# Patient Record
Sex: FEMALE | Race: WHITE | NOT HISPANIC OR LATINO
[De-identification: names, ages, dates, MRNs, and addresses within clinical notes are randomized per-mention and may not be internally consistent; named-entity substitution may affect disease eponyms.]

---

## 2018-03-20 ENCOUNTER — APPOINTMENT (OUTPATIENT)
Dept: HEART AND VASCULAR | Facility: CLINIC | Age: 73
End: 2018-03-20
Payer: MEDICARE

## 2018-03-20 VITALS
HEART RATE: 62 BPM | TEMPERATURE: 97.9 F | WEIGHT: 137.99 LBS | DIASTOLIC BLOOD PRESSURE: 80 MMHG | HEIGHT: 60.24 IN | SYSTOLIC BLOOD PRESSURE: 152 MMHG | BODY MASS INDEX: 26.74 KG/M2 | OXYGEN SATURATION: 97 %

## 2018-03-20 PROCEDURE — 99214 OFFICE O/P EST MOD 30 MIN: CPT | Mod: 25

## 2018-03-20 PROCEDURE — 93000 ELECTROCARDIOGRAM COMPLETE: CPT

## 2018-03-20 RX ORDER — DIVALPROEX SODIUM 500 MG/1
500 TABLET, DELAYED RELEASE ORAL
Refills: 0 | Status: ACTIVE | COMMUNITY

## 2018-03-20 RX ORDER — ASPIRIN 81 MG
81 TABLET, DELAYED RELEASE (ENTERIC COATED) ORAL
Refills: 0 | Status: ACTIVE | COMMUNITY

## 2018-03-20 RX ORDER — BUPROPION HCL 150 MG
150 TABLET,SUSTAINED-RELEASE 12 HR ORAL
Refills: 0 | Status: ACTIVE | COMMUNITY

## 2018-03-20 RX ORDER — BUPROPION HYDROCHLORIDE 150 MG/1
150 TABLET, EXTENDED RELEASE ORAL
Qty: 90 | Refills: 0 | Status: ACTIVE | COMMUNITY
Start: 2018-02-10

## 2018-03-20 RX ORDER — ESCITALOPRAM OXALATE 10 MG/1
10 TABLET, FILM COATED ORAL
Refills: 0 | Status: ACTIVE | COMMUNITY

## 2018-03-20 RX ORDER — DIVALPROEX SODIUM 250 MG/1
250 TABLET, DELAYED RELEASE ORAL
Refills: 0 | Status: ACTIVE | COMMUNITY

## 2018-03-20 RX ORDER — VALSARTAN 80 MG/1
80 TABLET ORAL DAILY
Qty: 90 | Refills: 3 | Status: ACTIVE | COMMUNITY

## 2018-03-20 RX ORDER — ESCITALOPRAM OXALATE 5 MG/1
5 TABLET, FILM COATED ORAL
Refills: 0 | Status: ACTIVE | COMMUNITY

## 2018-03-27 ENCOUNTER — APPOINTMENT (OUTPATIENT)
Dept: HEART AND VASCULAR | Facility: CLINIC | Age: 73
End: 2018-03-27

## 2018-04-05 ENCOUNTER — RX RENEWAL (OUTPATIENT)
Age: 73
End: 2018-04-05

## 2018-08-29 ENCOUNTER — RX RENEWAL (OUTPATIENT)
Age: 73
End: 2018-08-29

## 2019-02-14 ENCOUNTER — RX RENEWAL (OUTPATIENT)
Age: 74
End: 2019-02-14

## 2019-03-27 ENCOUNTER — RX RENEWAL (OUTPATIENT)
Age: 74
End: 2019-03-27

## 2019-03-28 ENCOUNTER — RX RENEWAL (OUTPATIENT)
Age: 74
End: 2019-03-28

## 2019-03-28 RX ORDER — SIMVASTATIN 10 MG/1
10 TABLET, FILM COATED ORAL DAILY
Qty: 10 | Refills: 0 | Status: ACTIVE | COMMUNITY
Start: 1900-01-01 | End: 1900-01-01

## 2019-06-27 ENCOUNTER — APPOINTMENT (OUTPATIENT)
Dept: OTOLARYNGOLOGY | Facility: CLINIC | Age: 74
End: 2019-06-27
Payer: MEDICARE

## 2019-06-27 PROCEDURE — 92557 COMPREHENSIVE HEARING TEST: CPT

## 2019-06-27 PROCEDURE — 92550 TYMPANOMETRY & REFLEX THRESH: CPT

## 2020-02-03 ENCOUNTER — NON-APPOINTMENT (OUTPATIENT)
Age: 75
End: 2020-02-03

## 2020-02-03 ENCOUNTER — APPOINTMENT (OUTPATIENT)
Dept: HEART AND VASCULAR | Facility: CLINIC | Age: 75
End: 2020-02-03
Payer: MEDICARE

## 2020-02-03 VITALS
OXYGEN SATURATION: 98 % | SYSTOLIC BLOOD PRESSURE: 116 MMHG | BODY MASS INDEX: 27.09 KG/M2 | DIASTOLIC BLOOD PRESSURE: 74 MMHG | WEIGHT: 138 LBS | HEART RATE: 68 BPM | HEIGHT: 60 IN

## 2020-02-03 DIAGNOSIS — I10 ESSENTIAL (PRIMARY) HYPERTENSION: ICD-10-CM

## 2020-02-03 DIAGNOSIS — I25.10 ATHEROSCLEROTIC HEART DISEASE OF NATIVE CORONARY ARTERY W/OUT ANGINA PECTORIS: ICD-10-CM

## 2020-02-03 DIAGNOSIS — Z01.810 ENCOUNTER FOR PREPROCEDURAL CARDIOVASCULAR EXAMINATION: ICD-10-CM

## 2020-02-03 DIAGNOSIS — Z00.00 ENCOUNTER FOR GENERAL ADULT MEDICAL EXAMINATION W/OUT ABNORMAL FINDINGS: ICD-10-CM

## 2020-02-03 PROCEDURE — 99213 OFFICE O/P EST LOW 20 MIN: CPT

## 2020-02-03 PROCEDURE — 93000 ELECTROCARDIOGRAM COMPLETE: CPT

## 2020-02-03 NOTE — DISCUSSION/SUMMARY
[Patient Intermediate Risk] : the patient is an intermediate risk [Procedure Intermediate Risk] : the procedure risk is intermediate [Optimized for Surgery] : the patient is optimized for surgery [As per surgery] : as per surgery [Continue] : Continue medications as currently directed [FreeTextEntry1] : RCRI Class 1 Risk\par hold ASA pending surgery\par >7 METS\par normal ECG\par CV stable for surgery

## 2020-02-03 NOTE — PHYSICAL EXAM
[General Appearance - Well Developed] : well developed [Normal Appearance] : normal appearance [No Deformities] : no deformities [Well Groomed] : well groomed [General Appearance - Well Nourished] : well nourished [Normal Conjunctiva] : the conjunctiva exhibited no abnormalities [General Appearance - In No Acute Distress] : no acute distress [Eyelids - No Xanthelasma] : the eyelids demonstrated no xanthelasmas [Normal Oral Mucosa] : normal oral mucosa [No Oral Pallor] : no oral pallor [No Oral Cyanosis] : no oral cyanosis [Normal Jugular Venous V Waves Present] : normal jugular venous V waves present [Normal Jugular Venous A Waves Present] : normal jugular venous A waves present [No Jugular Venous Andrews A Waves] : no jugular venous andrews A waves [Exaggerated Use Of Accessory Muscles For Inspiration] : no accessory muscle use [Respiration, Rhythm And Depth] : normal respiratory rhythm and effort [Auscultation Breath Sounds / Voice Sounds] : lungs were clear to auscultation bilaterally [Murmurs] : no murmurs present [Heart Sounds] : normal S1 and S2 [Heart Rate And Rhythm] : heart rate and rhythm were normal [Abdomen Tenderness] : non-tender [Abdomen Soft] : soft [Abdomen Mass (___ Cm)] : no abdominal mass palpated [Gait - Sufficient For Exercise Testing] : the gait was sufficient for exercise testing [Abnormal Walk] : normal gait [Cyanosis, Localized] : no localized cyanosis [Nail Clubbing] : no clubbing of the fingernails [Petechial Hemorrhages (___cm)] : no petechial hemorrhages [Skin Color & Pigmentation] : normal skin color and pigmentation [] : no rash [No Venous Stasis] : no venous stasis [Skin Lesions] : no skin lesions [No Skin Ulcers] : no skin ulcer [No Xanthoma] : no  xanthoma was observed [Oriented To Time, Place, And Person] : oriented to person, place, and time [Affect] : the affect was normal [No Anxiety] : not feeling anxious [Mood] : the mood was normal

## 2020-02-03 NOTE — HISTORY OF PRESENT ILLNESS
[Preoperative Visit] : for a medical evaluation prior to surgery [Surgeon Name ___] : surgeon: [unfilled] [Cardiovascular Disease] : cardiovascular disease [Anti-Platelet Agents] : anti-platelet agents [Prior Anesthesia] : Prior anesthesia [Electrocardiogram] : ~T an ECG ~C was performed [Cardiovascular Stress Test] : a cardiac stress test ~T ~C was performed [Cardiac Catheterization  (Diagnostic)] : cardiac catheterization ~T ~C was performed [Metabolic Capacity ___Mets%] : The patient has a metabolic capacity of [unfilled] Mets%  [Good] : Good [Fever] : no fever [Chills] : no chills [Fatigue] : no fatigue [Chest Pain] : no chest pain [Cough] : no cough [Dyspnea] : no dyspnea [Dysuria] : no dysuria [Urinary Frequency] : no urinary frequency [Nausea] : no nausea [Vomiting] : no vomiting [Diarrhea] : no diarrhea [Abdominal Pain] : no abdominal pain [Easy Bruising] : no easy bruising [Lower Extremity Swelling] : no lower extremity swelling [Poor Exercise Tolerance] : no poor exercise tolerance [Diabetes] : no diabetes [Pulmonary Disease] : no pulmonary disease [Nicotine Dependence] : no nicotine dependence [Alcohol Use] : no  alcohol use [GI Disease] : no gastrointestinal disease [Renal Disease] : no renal disease [Sleep Apnea] : no sleep apnea [Thromboembolic Problems] : no thromboembolic problems [Frequent use of NSAIDs] : no use of NSAIDs [Impaired Immunity] : no impaired immunity [Transfusion Reaction] : no transfusion reaction [Steroid Use in Last 6 Months] : no steroid use in the last six months [Frequent Aspirin Use] : no frequent aspirin use [Prev Anesthesia Reaction] : no previous anesthesia reaction [Anesthesia Reaction] : no anesthesia reaction [Sudden Death] : no sudden death [Clotting Disorder] : no clotting disorder [Bleeding Disorder] : no bleeding disorder [de-identified] : Rt breast Lumpectomy [FreeTextEntry1] : \par 75 F CAD HTN HPL here for preop cv eval for above surgery\par \par PCI x 3, 1998, ISR x2 PCI 2000\par NST within 5 years normal\par \par ECG today nsr normal segments and intervals

## 2020-08-20 ENCOUNTER — OUTPATIENT (OUTPATIENT)
Dept: OUTPATIENT SERVICES | Facility: HOSPITAL | Age: 75
LOS: 1 days | End: 2020-08-20
Payer: COMMERCIAL

## 2020-08-20 DIAGNOSIS — R06.02 SHORTNESS OF BREATH: ICD-10-CM

## 2020-08-20 PROCEDURE — 93306 TTE W/DOPPLER COMPLETE: CPT

## 2020-08-20 PROCEDURE — 93306 TTE W/DOPPLER COMPLETE: CPT | Mod: 26

## 2020-08-20 PROCEDURE — 93017 CV STRESS TEST TRACING ONLY: CPT

## 2022-04-14 ENCOUNTER — APPOINTMENT (OUTPATIENT)
Dept: ORTHOPEDIC SURGERY | Facility: CLINIC | Age: 77
End: 2022-04-14
Payer: MEDICARE

## 2022-04-14 PROCEDURE — 99203 OFFICE O/P NEW LOW 30 MIN: CPT

## 2022-04-14 NOTE — PHYSICAL EXAM
[de-identified] : PHYSICAL EXAM  RIGHT  SHOULDER\par \par MODERATE  SCAPULAR PROTRACTION\par AROM 120 / 120 / 70 / 0\par TENDER: SA REGION  LATERAL \par \par SPECIAL TESTING :\par NAM - POSITIVE \par STELLA - POSITIVE \par SPEED TEST - POSITIVE\par \par DANG - NEGATIVE \par APPREHENSION AND SUPPRESSION - NEGATIVE \par \par RC STRENGTH TESTING \par SS:  4/5\par SUB 5/5\par IS     5/5\par BICEPS  5/5\par \par SENSATION  - GROSSLY INTACT\par \par \par  [de-identified] : JAN 31, 2022- RIGHT SHOULDER MRI \par IMPRESSION: \par THERE IS A FULL THICKNESS TEAR OF THE SUPRASPINATUS TENDON WITH TENDON RETRACTION, CHRONIC. MODERATE MUSCLE ATROPHY. \par 2. PARTIAL THICKNESS TEARING OF THE INFRASPINATUS TENDON WITHOUT TENDON RETRACTION. MODERATE MUSCLE ATROPHY. \par 3. MILD CHRONIC PARTIAL TEARING OF THE SUBSCAPULARIS TENDON WITH TENDON THINNING BUT NO TENDON RETRACTION OR MUSCLE ATROPHY. \par 4. GLENOHUMERAL JOINT FLUID EXTRAVASATED TO THE SUBACROMIAL- SUBDELTOID BURSA. \par 5. MILD CARTILAGE THINNING IN THE SUPERIOR HUMERAL HEAD. 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 \par 6. COMPLETE RUPTURE OF THE LONG HEAD OF THE BICEPS TENDON WITH DISTAL TENDON RETRACTION. \par

## 2022-04-14 NOTE — DISCUSSION/SUMMARY
[de-identified] : PREOP SHOULDER SURGERY DISCUSSION:\par \par PROCEDURE DISCUSSED - QUESTIONS ANSWERED\par PATIENT WISHES TO PROCEED\par \par POST OP CARE AND LIMITATIONS REVIEWED - HANDOUT PROVIDED \par \par COLD PACKS RECOMMENDED\par ANALGESICS PRESCRIBED \par \par \par THERE ARE NO GUARANTEES THAT ALL SYMPTOMS WILL BE ALLEVIATED  \par SHOULDER ARTHROSCOPY, ACROMIOPLASTY, DEBRIDEMENT,  RC REPAIR AND LABRUM REPAIRS- ON AVERAGE 75- 85% SATISFACTORY RESULTS FOR TEARS < 3CM AFTER 9-12 MONTHS HEALING AND REHABILITATION. \par \par REPAIRS WILL REQUIRE STRICT SHOULDER IMMOBILIZER 4-6 WEEKS\par \par RC TEARS 3CM OR LARGER MAY REQUIRE COLLAGEN PATCH AUGMENTATION GENERALLY HAVE LESS SATISFACTORY RESULTS\par \par PHYSICAL THERAPY REQUIRED 2X WEEK FOR  MINIMUM 8-12 WEEKS FOR ALL PROCEDURES \par CONTINUED HOME EXERCISES 6-9 MONTHS AFTER THAT REQUIRED FOR OPTIMAL OUTCOMES \par \par ROUTINE SURGICAL AND ANESTHETIC RISKS INCLUDE RISK OF SURGICAL INFECTION, ANESTHETIC COMPLICATION OR ALLERGY, POSSIBLE RETEARS OR PROGRESSION OF TEAR, STIFFNESS OF SHOULDER AND UNSATISFACTORY OUTCOMES\par \par PATIENT UNDERSTANDS AND WISHES TO PROCEED\par

## 2022-04-14 NOTE — HISTORY OF PRESENT ILLNESS
[de-identified] : RIGHT SHOULDER PAIN \par PAIN STARTED FEBRUARY 2022- NO SPECIFIC INJURY \par INTERMITTENT \par SHARP\par RADIATES UP NECK \par STIFFNESS\par PAIN LEVEL: 8/10 \par WORSE WITH OVER HEAD LIFTING, REACHING BEHIND, AT NIGHT \par PT TRIED PHYSICAL THERAPY FOR ONE MONTH (MARCH 2022)- HELPFUL \par PT HAD ONE CORTISONE INJECTION FEBRUARY 2022- HELPFUL FOR 2-3 WEEKS  \par PT HAS MRI AVAILABLE FROM [facility] 01/31/2022\par BETTER WITH TYLENOL AND REST

## 2022-05-04 ENCOUNTER — APPOINTMENT (OUTPATIENT)
Dept: ORTHOPEDIC SURGERY | Facility: CLINIC | Age: 77
End: 2022-05-04
Payer: MEDICARE

## 2022-05-04 PROCEDURE — 99213 OFFICE O/P EST LOW 20 MIN: CPT

## 2022-05-04 RX ORDER — ONDANSETRON 8 MG/1
8 TABLET, ORALLY DISINTEGRATING ORAL 3 TIMES DAILY
Qty: 15 | Refills: 0 | Status: ACTIVE | COMMUNITY
Start: 2022-05-04 | End: 1900-01-01

## 2022-05-04 RX ORDER — OXYCODONE AND ACETAMINOPHEN 7.5; 325 MG/1; MG/1
7.5-325 TABLET ORAL
Qty: 42 | Refills: 0 | Status: ACTIVE | COMMUNITY
Start: 2022-05-04 | End: 1900-01-01

## 2022-05-04 NOTE — DISCUSSION/SUMMARY
[de-identified] : \par \par PROCEDURE DISCUSSED - QUESTIONS ANSWERED\par PATIENT WISHES TO PROCEED\par \par POST OP CARE AND LIMITATIONS REVIEWED - HANDOUT PROVIDED \par \par COLD PACKS RECOMMENDED\par ANALGESICS PRESCRIBED \par \par \par THERE ARE NO GUARANTEES THAT ALL SYMPTOMS WILL BE ALLEVIATED  \par SHOULDER ARTHROSCOPY, ACROMIOPLASTY, DEBRIDEMENT,  RC REPAIR AND LABRUM REPAIRS- ON AVERAGE 75- 85% SATISFACTORY RESULTS FOR TEARS < 3CM AFTER 9-12 MONTHS HEALING AND REHABILITATION. \par \par REPAIRS WILL REQUIRE STRICT SHOULDER IMMOBILIZER 4-6 WEEKS\par \par RC TEARS 3CM OR LARGER MAY REQUIRE COLLAGEN PATCH AUGMENTATION GENERALLY HAVE LESS SATISFACTORY RESULTS\par \par PHYSICAL THERAPY REQUIRED 2X WEEK FOR  MINIMUM 8-12 WEEKS FOR ALL PROCEDURES \par CONTINUED HOME EXERCISES 6-9 MONTHS AFTER THAT REQUIRED FOR OPTIMAL OUTCOMES \par \par ROUTINE SURGICAL AND ANESTHETIC RISKS INCLUDE RISK OF SURGICAL INFECTION, ANESTHETIC COMPLICATION OR ALLERGY, POSSIBLE RETEARS OR PROGRESSION OF TEAR, STIFFNESS OF SHOULDER AND UNSATISFACTORY OUTCOMES\par \par PATIENT UNDERSTANDS AND WISHES TO PROCEED\par

## 2022-05-04 NOTE — PHYSICAL EXAM
[de-identified] : PHYSICAL EXAM  RIGHT  SHOULDER\par \par MODERATE  SCAPULAR PROTRACTION\par AROM 120 / 120 / 70 / 0\par TENDER: SA REGION  LATERAL \par \par SPECIAL TESTING :\par NAM - POSITIVE \par STELLA - POSITIVE \par SPEED TEST - POSITIVE\par \par DANG - NEGATIVE \par APPREHENSION AND SUPPRESSION - NEGATIVE \par \par RC STRENGTH TESTING \par SS:  4/5\par SUB 5/5\par IS     5/5\par BICEPS  5/5\par \par SENSATION  - GROSSLY INTACT\par \par \par

## 2022-05-10 ENCOUNTER — APPOINTMENT (OUTPATIENT)
Dept: ORTHOPEDIC SURGERY | Facility: AMBULATORY SURGERY CENTER | Age: 77
End: 2022-05-10
Payer: MEDICARE

## 2022-05-10 PROCEDURE — 29826 SHO ARTHRS SRG DECOMPRESSION: CPT | Mod: RT,59

## 2022-05-10 PROCEDURE — 29827 SHO ARTHRS SRG RT8TR CUF RPR: CPT | Mod: RT

## 2022-05-10 PROCEDURE — 29823 SHO ARTHRS SRG XTNSV DBRDMT: CPT | Mod: RT,59

## 2022-05-10 PROCEDURE — 29825 SHO ARTHRS SRG LSS&RESCJ ADS: CPT | Mod: RT,59

## 2022-05-10 PROCEDURE — 29820 SHO ARTHRS SRG PRTL SYNVCT: CPT | Mod: RT,59

## 2022-05-13 ENCOUNTER — APPOINTMENT (OUTPATIENT)
Dept: ORTHOPEDIC SURGERY | Facility: CLINIC | Age: 77
End: 2022-05-13
Payer: MEDICARE

## 2022-05-13 PROCEDURE — 73030 X-RAY EXAM OF SHOULDER: CPT | Mod: RT

## 2022-05-13 PROCEDURE — 99024 POSTOP FOLLOW-UP VISIT: CPT

## 2022-05-13 RX ORDER — IBUPROFEN 800 MG/1
800 TABLET ORAL 3 TIMES DAILY
Qty: 90 | Refills: 5 | Status: ACTIVE | COMMUNITY
Start: 2022-05-13 | End: 1900-01-01

## 2022-05-13 RX ORDER — LUBIPROSTONE 24 UG/1
24 CAPSULE, GELATIN COATED ORAL TWICE DAILY
Qty: 14 | Refills: 0 | Status: ACTIVE | COMMUNITY
Start: 2022-05-13 | End: 1900-01-01

## 2022-05-13 NOTE — HISTORY OF PRESENT ILLNESS
[de-identified] : RIGHT SHOULDER\par FOLLOW UP \par POST OP \par PAIN LEVEL 7/10\par MAY 1, 2022- RIGHT 4CM   ROT CUFF MARGIN CONVERGENCE AND DOUBLE ROW REPAIR, DEANDRA\par WEARINH SHOULDER IMMOBILIZER \par ALSO COMPLAINS OF OPIATE CONSTIPATION \par

## 2022-05-13 NOTE — PHYSICAL EXAM
[de-identified] : POST OP SHOULDER EXAM \par MAY 1, 2022- RIGHT 4CM   ROT CUFF MARGIN CONVERGENCE AND DOUBLE ROW REPAIR, DEANDRA\par \par PORTALS HEALING WELL - NO ERYTHEMA OR CALOR\par SUTURES REMOVED, STERISTRIPS AND WATERPROOF BANDAIDS  APPLIED \par \par AROM  NT\par \par DISTAL CMS INTACT\par  [de-identified] : RIGHT SHOULDER XRAY (2 VIEWS - AP AND OUTLET)  -  \par NO OBVIOUS FRACTURE OR DISLOCATION, \par SATISFACTORY DECOMPRESSION NOTED  , \par ANCHOR SILHOUETTES VISIBLE IN GREATER TUBEROSITY- SATISFACTORY POSITION\par

## 2022-05-13 NOTE — DISCUSSION/SUMMARY
[de-identified] : MAY 1, 2022- RIGHT 4CM   ROT CUFF MARGIN CONVERGENCE AND DOUBLE ROW REPAIR, DEANDRA\par \par POST OP REPAIR:\par \par COLD THERAPY,ANALGESICS AS NEEDED\par \par SHOULDER IMMOBILIZED FULL TIME X 4 - 6 WEEKS - STRICT NO AROM - DESKTOP WORK ALLOWED\par \par SCAPULAR SQUEEZES / ROLLS, ELBOW, WRIST, HAND AROM TID \par \par START PENDULUM EXERCISES, EXT ROT  3 WEEK POSTOP\par \par START AAROM FLEXION, PULLEY  5 WEEKS POST OP\par \par 6 WEEKS POSTOP  - ADVANCE TO P.T.\par \par

## 2022-05-13 NOTE — ASSESSMENT
[FreeTextEntry1] : MAY 1, 2022- RIGHT 4CM   ROT CUFF MARGIN CONVERGENCE AND DOUBLE ROW REPAIR, DEANDRA

## 2022-06-01 ENCOUNTER — APPOINTMENT (OUTPATIENT)
Dept: ORTHOPEDIC SURGERY | Facility: CLINIC | Age: 77
End: 2022-06-01
Payer: MEDICARE

## 2022-06-01 PROCEDURE — 99024 POSTOP FOLLOW-UP VISIT: CPT

## 2022-06-01 NOTE — DISCUSSION/SUMMARY
[de-identified] : MAY 1, 2022- RIGHT 4CM   ROT CUFF MARGIN CONVERGENCE AND DOUBLE ROW REPAIR, DEANDRA\par \par POST OP REPAIR:\par \par COLD THERAPY,ANALGESICS AS NEEDED\par \par SHOULDER IMMOBILIZED FULL TIME X 4 - 6 WEEKS - STRICT NO AROM - DESKTOP WORK ALLOWED\par \par SCAPULAR SQUEEZES / ROLLS, ELBOW, WRIST, HAND AROM TID \par \par START PENDULUM EXERCISES,  3 WEEK POSTOP\par \par START AAROM FLEXION, PULLEY  5 WEEKS POST OP\par \par 6 WEEKS POSTOP  - ADVANCE TO P.T.\par \par

## 2022-06-01 NOTE — PHYSICAL EXAM
[de-identified] : POST OP SHOULDER EXAM \par MAY 1, 2022- RIGHT 4CM   ROT CUFF MARGIN CONVERGENCE AND DOUBLE ROW REPAIR, DEANDRA\par \par PORTALS HEALING WELL - NO ERYTHEMA OR CALOR\par \par \par AROM  PENDULUM \par \par DISTAL CMS INTACT\par

## 2022-06-20 ENCOUNTER — APPOINTMENT (OUTPATIENT)
Dept: ORTHOPEDIC SURGERY | Facility: CLINIC | Age: 77
End: 2022-06-20
Payer: MEDICARE

## 2022-06-20 PROCEDURE — 99024 POSTOP FOLLOW-UP VISIT: CPT

## 2022-06-20 NOTE — HISTORY OF PRESENT ILLNESS
[de-identified] : RIGHT SHOULDER\par FOLLOW UP \par POST OP-6 WEEKS  \par PAIN LEVEL 3/10 \par MAY 10, 2022- RIGHT 4 CM   ROT CUFF MARGIN CONVERGENCE AND DOUBLE ROW REPAIR, DEANDRA\par WEARING SHOULDER IMMOBILIZER \par

## 2022-06-20 NOTE — PHYSICAL EXAM
[de-identified] : POST OP SHOULDER EXAM \par MAY 1, 2022- RIGHT 4CM   ROT CUFF MARGIN CONVERGENCE AND DOUBLE ROW REPAIR, DEANDRA\par \par PORTALS HEALING WELL - NO ERYTHEMA OR CALOR\par \par \par AROM  PENDULUM \par \par DISTAL CMS INTACT\par

## 2022-06-20 NOTE — DISCUSSION/SUMMARY
[de-identified] : MAY 1, 2022- RIGHT 4CM   ROT CUFF MARGIN CONVERGENCE AND DOUBLE ROW REPAIR, DEANDRA

## 2022-09-12 ENCOUNTER — APPOINTMENT (OUTPATIENT)
Dept: ORTHOPEDIC SURGERY | Facility: CLINIC | Age: 77
End: 2022-09-12

## 2022-09-12 DIAGNOSIS — M75.101 UNSPECIFIED ROTATOR CUFF TEAR OR RUPTURE OF RIGHT SHOULDER, NOT SPECIFIED AS TRAUMATIC: ICD-10-CM

## 2022-09-12 PROCEDURE — 99213 OFFICE O/P EST LOW 20 MIN: CPT

## 2022-09-12 NOTE — HISTORY OF PRESENT ILLNESS
[de-identified] : RIGHT SHOULDER\par FOLLOW UP \par POST OP - 4 MONTHS \par PAIN LEVEL 1/10 \par MAY 10, 2022- RIGHT 4 CM   ROT CUFF MARGIN CONVERGENCE AND DOUBLE ROW REPAIR, DEANDRA\par PT IS GOING TO PHYSICAL THERPAY 2 X WEEK-HELPFUL \par AT HOME EXERCISES-HELPFUL \par

## 2022-09-12 NOTE — DISCUSSION/SUMMARY
[de-identified] : MAY 1, 2022- RIGHT 4CM ROT CUFF MARGIN CONVERGENCE AND DOUBLE ROW REPAIR, DEANDRA. \par \par CONTNUE  PT AND HOME EXERCISES

## 2022-09-12 NOTE — PHYSICAL EXAM
[de-identified] : POST OP SHOULDER EXAM \par MAY 1, 2022- RIGHT 4 CM   ROT CUFF MARGIN CONVERGENCE AND DOUBLE ROW REPAIR, DEANDRA\par \par PORTALS HEALING WELL - NO ERYTHEMA OR CALOR\par \par \par AROM  PENDULUM 125 / 120 / \par \par DISTAL CMS INTACT\par